# Patient Record
Sex: FEMALE | Race: WHITE | Employment: FULL TIME | URBAN - METROPOLITAN AREA
[De-identification: names, ages, dates, MRNs, and addresses within clinical notes are randomized per-mention and may not be internally consistent; named-entity substitution may affect disease eponyms.]

---

## 2017-02-09 ENCOUNTER — OFFICE VISIT (OUTPATIENT)
Dept: ORTHOPEDIC SURGERY | Age: 52
End: 2017-02-09

## 2017-02-09 VITALS
WEIGHT: 175.04 LBS | DIASTOLIC BLOOD PRESSURE: 86 MMHG | HEIGHT: 67 IN | BODY MASS INDEX: 27.47 KG/M2 | HEART RATE: 80 BPM | SYSTOLIC BLOOD PRESSURE: 148 MMHG

## 2017-02-09 DIAGNOSIS — M54.16 LUMBAR RADICULOPATHY: Primary | ICD-10-CM

## 2017-02-09 DIAGNOSIS — M25.551 RIGHT HIP PAIN: ICD-10-CM

## 2017-02-09 PROCEDURE — 99213 OFFICE O/P EST LOW 20 MIN: CPT | Performed by: ORTHOPAEDIC SURGERY

## 2017-02-09 PROCEDURE — 73502 X-RAY EXAM HIP UNI 2-3 VIEWS: CPT | Performed by: ORTHOPAEDIC SURGERY

## 2017-03-10 ENCOUNTER — OFFICE VISIT (OUTPATIENT)
Dept: ORTHOPEDIC SURGERY | Age: 52
End: 2017-03-10

## 2017-03-10 VITALS — WEIGHT: 175.04 LBS | BODY MASS INDEX: 27.47 KG/M2 | HEIGHT: 67 IN

## 2017-03-10 DIAGNOSIS — M76.31 IT BAND SYNDROME, RIGHT: ICD-10-CM

## 2017-03-10 DIAGNOSIS — M47.816 LUMBAR FACET ARTHROPATHY: Primary | ICD-10-CM

## 2017-03-10 PROCEDURE — 99213 OFFICE O/P EST LOW 20 MIN: CPT | Performed by: PHYSICIAN ASSISTANT

## 2018-11-29 ENCOUNTER — OFFICE VISIT (OUTPATIENT)
Dept: ORTHOPEDIC SURGERY | Age: 53
End: 2018-11-29
Payer: COMMERCIAL

## 2018-11-29 VITALS
BODY MASS INDEX: 28.93 KG/M2 | WEIGHT: 180 LBS | HEIGHT: 66 IN | DIASTOLIC BLOOD PRESSURE: 91 MMHG | HEART RATE: 100 BPM | SYSTOLIC BLOOD PRESSURE: 145 MMHG

## 2018-11-29 DIAGNOSIS — M25.552 BILATERAL HIP PAIN: Primary | ICD-10-CM

## 2018-11-29 DIAGNOSIS — M25.551 BILATERAL HIP PAIN: Primary | ICD-10-CM

## 2018-11-29 DIAGNOSIS — M76.32 ILIOTIBIAL BAND SYNDROME OF LEFT SIDE: ICD-10-CM

## 2018-11-29 PROCEDURE — 99213 OFFICE O/P EST LOW 20 MIN: CPT | Performed by: ORTHOPAEDIC SURGERY

## 2019-04-11 ENCOUNTER — OFFICE VISIT (OUTPATIENT)
Dept: ORTHOPEDIC SURGERY | Age: 54
End: 2019-04-11
Payer: COMMERCIAL

## 2019-04-11 VITALS — BODY MASS INDEX: 28.91 KG/M2 | HEIGHT: 66 IN | WEIGHT: 179.9 LBS | RESPIRATION RATE: 18 BRPM

## 2019-04-11 DIAGNOSIS — M18.12 ARTHRITIS OF CARPOMETACARPAL (CMC) JOINT OF LEFT THUMB: ICD-10-CM

## 2019-04-11 DIAGNOSIS — M79.645 FINGER PAIN, LEFT: Primary | ICD-10-CM

## 2019-04-11 PROCEDURE — 99203 OFFICE O/P NEW LOW 30 MIN: CPT | Performed by: ORTHOPAEDIC SURGERY

## 2019-04-11 PROCEDURE — L3924 HFO WITHOUT JOINTS PRE OTS: HCPCS | Performed by: ORTHOPAEDIC SURGERY

## 2020-02-06 ENCOUNTER — OFFICE VISIT (OUTPATIENT)
Dept: ORTHOPEDIC SURGERY | Age: 55
End: 2020-02-06
Payer: COMMERCIAL

## 2020-02-06 VITALS — WEIGHT: 175 LBS | HEIGHT: 67 IN | BODY MASS INDEX: 27.47 KG/M2

## 2020-02-06 PROBLEM — M25.512 ACUTE PAIN OF LEFT SHOULDER: Status: ACTIVE | Noted: 2020-02-06

## 2020-02-06 PROCEDURE — 99204 OFFICE O/P NEW MOD 45 MIN: CPT | Performed by: ORTHOPAEDIC SURGERY

## 2020-02-06 NOTE — PROGRESS NOTES
2/6/20  History of Present Illness:  John Sandoval is a 53 y.o. female left shoulder evaluation moderate left shoulder pain she had a type II labral repair in 2012 she is doing well until recently she had some increased pain around Thanksgiving and is gotten significantly worse    Chief complaint presents and in the office today: Left shoulder pain    Location left shoulder  Severity  Moderate  Duration since November  Associated sign/symptoms pain, decreased strength, decreased range of motion some occasional catching    I have reviewed and discussed the below Pain assessment findings with the patient. Pain Assessment  Location of Pain: Shoulder  Location Modifiers: Left  Severity of Pain: 3  Quality of Pain: Sharp, Aching  Duration of Pain: Persistent  Frequency of Pain: Intermittent  Aggravating Factors: Exercise, Bending, Straightening  Limiting Behavior: Some  Relieving Factors: Rest, Nsaids  Result of Injury: No  Work-Related Injury: No  Are there other pain locations you wish to document?: No    Medical History  Patient's medications, allergies, past medical, surgical, social and family histories were reviewed and updated as appropriate. History reviewed. No pertinent past medical history. Family History   Problem Relation Age of Onset    High Blood Pressure Father     Heart Disease Father     Diabetes Father     Anesth Problems Neg Hx     Malig Hyperten Neg Hx     Hypotension Neg Hx     Malig Hypertherm Neg Hx     Pseudochol.  Deficiency Neg Hx      Social History     Socioeconomic History    Marital status:      Spouse name: None    Number of children: None    Years of education: None    Highest education level: None   Occupational History    None   Social Needs    Financial resource strain: None    Food insecurity:     Worry: None     Inability: None    Transportation needs:     Medical: None     Non-medical: None   Tobacco Use    Smoking status: Never Smoker    Smokeless internal rotation -- between 70-90 degree    Cervical spine exam demonstrates no  Radiculopathy no reproduction of the symptomology. Range of motion is normal without pain or radiculopathy and does not cause shoulder pain. Cranial nerve exam:    1- smell-- patient states no Olfactory problem  2- visual acuity is intact  3- moves eyes, and pupils are reactive  4- extra-occular muscles are intact  5- facial sensation is intact no muscle atrophy  6- extra occular muscles are intact  7- mouth moist and facial expressions are intact  8- good hearing and no difficulty with recognition  9- patient has no difficulty swallowing  10- no difficulty breathing and no Gastrointestinal problems good cough   11- moves head with all motion and no swallowing problems  12- normal speech and tongue protrudes midline    Additional Examinations:  Right Lower Extremity: Examination of the right lower extremity does not show any tenderness, deformity or injury. Range of motion is unremarkable. There is no gross instability. There are no rashes, ulcerations or lesions. Strength and tone are normal.  Left Lower Extremity: Examination of the left lower extremity does not show any tenderness, deformity or injury. Range of motion is unremarkable. There is no gross instability. There are no rashes, ulcerations or lesions. Strength and tone are normal.  Neck: Examination of the neck does not show any tenderness, deformity or injury. Range of motion is unremarkable. There is no gross instability. There are no rashes, ulcerations or lesions.   Strength and tone are normal.        IMPRESSION:    Diagnostic testing:  X-rays reviewed in office by myself, I personally and independently reviewed the films in the office today : I reviewed multiple X-rays today of the left shoulder:  Anterior posterior, lateral, axillary:  Show no fracture, no dislocation, no signs of any masses or tumors, no significant glenohumeral arthritis, no significant a.c. Joint arthritis, good joint space maintenance,    Impression no significant bony abnormality  MRI: Ordered today to rule out re-tear of her type II SLAP repair or other labral injury  Labs: None          Past Surgical History:   Procedure Laterality Date    HIP SURGERY      HYSTERECTOMY      JOINT REPLACEMENT      right hip    KNEE ARTHROSCOPY Right 8/8/14    partial lateral menisectomy, synovectomy    SHOULDER SURGERY  2-26-13    LEFT SHOULDER ARTHROSCOPY, SUBACROMIAL DECOMPRESSION ,   .    Office Procedures:  Orders Placed This Encounter   Procedures    XR SHOULDER LEFT (MIN 2 VIEWS)     Standing Status:   Future     Number of Occurrences:   1     Standing Expiration Date:   2/6/2021       Previous Treatments: She had a type II SLAP repair back in 2012, X-ray, anti-inflammatories,    Differential Diagnoses: Impingement, AC joint osteoarthritis,  Rotator cuff tear, Labral tear, Instability, loose body,  Long head of bicep injury,  Glenohumeral osteoarthritis, AC joint separation, SLAP tear, Posterior labral tear, Anterior Labral tear, neck pathology, brachioplexis injury, muscle injury, neck radiculopathy, bone tumor, fracture,    Diagnosis reinjury to her SLAP tear        Plan: (Medical Decision Making)    1. Medications - none at this time  2. PT -she works out regularly with a   3. Further imaging -MRI  4. Follow up -after MRI      Carlos Enrique Lee. RAMU De Souza. [facility]  Sports Fellowship Trained  Board Certified  [employer] Team Physician      Disclaimer: \"This note was dictated with voice recognition software. Though review and correction are routine, we apologize for any errors. \"

## 2020-02-13 ENCOUNTER — OFFICE VISIT (OUTPATIENT)
Dept: ORTHOPEDIC SURGERY | Age: 55
End: 2020-02-13
Payer: COMMERCIAL

## 2020-02-13 VITALS — WEIGHT: 175.04 LBS | HEIGHT: 66 IN | BODY MASS INDEX: 28.13 KG/M2

## 2020-02-13 PROCEDURE — 99214 OFFICE O/P EST MOD 30 MIN: CPT | Performed by: ORTHOPAEDIC SURGERY

## 2020-02-13 PROCEDURE — 20610 DRAIN/INJ JOINT/BURSA W/O US: CPT | Performed by: ORTHOPAEDIC SURGERY

## 2020-02-13 RX ORDER — BETAMETHASONE SODIUM PHOSPHATE AND BETAMETHASONE ACETATE 3; 3 MG/ML; MG/ML
6 INJECTION, SUSPENSION INTRA-ARTICULAR; INTRALESIONAL; INTRAMUSCULAR; SOFT TISSUE ONCE
Status: COMPLETED | OUTPATIENT
Start: 2020-02-13 | End: 2020-02-13

## 2020-02-13 RX ADMIN — BETAMETHASONE SODIUM PHOSPHATE AND BETAMETHASONE ACETATE 6 MG: 3; 3 INJECTION, SUSPENSION INTRA-ARTICULAR; INTRALESIONAL; INTRAMUSCULAR; SOFT TISSUE at 11:02

## 2020-02-13 NOTE — PROGRESS NOTES
2/13/20  History of Present Illness:  Sofía Cassidy is a 53 y.o. female    Patient's chief complaint in the office today includes: Evaluation left shoulder here today to discuss options    Location left shoulder  Severity moderate to severe  Duration several months now  Associated sign/symptoms pain with range of motion pain with activities    Medical History  Patient's medications, allergies, past medical, surgical, social and family histories were reviewed and updated as appropriate. Review of Systems  No rash  No numbness  No tingling  No fevers  No depression    Pertinent items are noted in HPI  Review of systems reviewed from Patient History Form dated on 2/6/2020 and available in the patient's chart under the Media tab. No change in her medical history form                                         Examination    General Exam:   Vitals: Height 5' 6.5\" (1.689 m), weight 175 lb 0.7 oz (79.4 kg). Constitutional: Patient is adequately groomed with no evidence of malnutrition  Mental Status: The patient is oriented to time, place and person. The patient's mood and affect are appropriate. PHYSICAL EXAM:      Shoulder Examination  Inspection:  No swelling, no deformity, no erythema, no drainage, no signs of infection     Palpation:  Palpation reveals no effusion minimal pain, no warmth,     Range of Motion:  gentle range of motion with no crepitus passive motion to 150 degrees of forward flextion    Strength:  Intact strength with internal and external rotation along with  supraspinatus isolation bilaterally, Shoulder shrug is 5 over 5 , cervical spine strength is excellent, flexion extension at the elbow is 5 over 5 wrist and hand strength is equal bilaterally no winging no muscle atrophy.    Palpation:  Lateral deltoid moderate pain to palpation  AC joint mild pain to palopation  Mild pain Anterior to palpation  Mild pain Posterior to palpation  Mild trapezial pain to and passive range of motion bilaterally. Strength is excellent with internal rotation against resistance external rotation against resistance supraspinatus isolation against resistance. Shoulder shrug strength is 5 over 5 equal bilaterally. Radial ulnar and median nerve function is intact. Capillary refill is brisk. Elbow motion finger and wrist motion is full equal bilaterally. Deep tendon reflexes of the Brachial radialis, biceps, tricepsAre all +2/4 equal bilaterally. Cervical spine range of motion is full without pain negative Spurling's test.  Load-and-shift test is negative. Crank test is negative. Apprehension and relocation is negative. Anterior and posterior glide are equal bilaterally. Negative sulcus sign. No signs of any significant multidirectional instability. There is no scapular winging. There is no muscle atrophy of the latissimus dorsi, the deltoid, the periscapular musculature,The trapezius musculature or the pectoralis musculature. Negative Neer's test, negative Hudson test, no pain with crossarm elevation. Abduction --150 degrees  Flexion-- 180 degrees  Extension-- between 45-60 degrees  Latera/external  rotation --close to 90 degrees  Medial/ internal rotation -- between 70-90 degree      Reflex: upper extremity: Deep tendon reflexes of the biceps, triceps, brachioradialis +2/4 equal bilaterally  Lower extremity: +2/4 and equal bilaterally for patella and Achilles    Additional Comments:       Cervical spine exam demonstrates no  Radiculopathy no reproduction of the symptomology. Range of motion is normal without pain or radiculopathy and does not cause shoulder pain. Additional Examinations:  Right Lower Extremity: Examination of the right lower extremity does not show any tenderness, deformity or injury. Range of motion is unremarkable. There is no gross instability. There are no rashes, ulcerations or lesions.   Strength and tone are normal.  Left Lower Extremity:

## 2020-02-13 NOTE — PROGRESS NOTES
2/13/20 10:59 AM    Bupivacaine Injection . 50%      NDC: 0487-6650-53    Lot Number: [id number]    Body Part: Left Shoulder

## 2020-02-27 ENCOUNTER — OFFICE VISIT (OUTPATIENT)
Dept: ORTHOPEDIC SURGERY | Age: 55
End: 2020-02-27
Payer: COMMERCIAL

## 2020-02-27 VITALS — BODY MASS INDEX: 28.13 KG/M2 | WEIGHT: 175.04 LBS | HEIGHT: 66 IN

## 2020-02-27 PROCEDURE — 99214 OFFICE O/P EST MOD 30 MIN: CPT | Performed by: ORTHOPAEDIC SURGERY

## 2020-02-27 NOTE — PROGRESS NOTES
2/27/20  History of Present Illness:  Macho Burroughs is a 53 y.o. female    Patient's chief complaint in the office today includes: Recheck evaluation left shoulder here today to discuss options we injected her last visit she is doing better she just has a bad day today    Location left shoulder  Severity it is bothering her today but had not since the injection  Duration 1 day  Associated sign/symptoms anterior shoulder pain    Medical History  Patient's medications, allergies, past medical, surgical, social and family histories were reviewed and updated as appropriate. Review of Systems  No rash  No numbness  No tingling  No fevers  No depression    Pertinent items are noted in HPI  Review of systems reviewed from Patient History Form dated on 2/6/2020 and available in the patient's chart under the Media tab. No change in her medical history form                                         Examination    General Exam:   Vitals: Height 5' 6.5\" (1.689 m), weight 175 lb 0.7 oz (79.4 kg). Constitutional: Patient is adequately groomed with no evidence of malnutrition  Mental Status: The patient is oriented to time, place and person. The patient's mood and affect are appropriate. PHYSICAL EXAM:      Shoulder Examination  Inspection:  No swelling, no deformity, no erythema, no drainage, no signs of infection     Palpation:  Palpation reveals no effusion minimal pain, no warmth,     Range of Motion:  gentle range of motion with no crepitus passive motion to 150 degrees of forward flextion    Strength:  Intact strength with internal and external rotation along with  supraspinatus isolation bilaterally, Shoulder shrug is 5 over 5 , cervical spine strength is excellent, flexion extension at the elbow is 5 over 5 wrist and hand strength is equal bilaterally no winging no muscle atrophy.    Palpation:  Lateral deltoid no pain to palpation  AC joint no pain to palopation  Moderate pain

## 2021-05-27 NOTE — PROGRESS NOTES
HISTORY OF PRESENT ILLNESS:  The patient is a 13-year-old right-hand-dominant female who presents today for a new hand surgery specialty evaluation regarding her left thumb. She does not remember any specific injury but in the last year she started to notice increasing pain over the base of the thumb. This is noted when gripping, opening jars, or lifting grandchildren. She denies any numbness or tingling. PAST MEDICAL HISTORY: Patient's medications, allergies, past medical, surgical, social and family histories were reviewed and updated as appropriate. ROS: Pertinent items are noted in HPI. Review of systems reviewed from patient history form dated on 11/29/2018 and available in the patient's chart under the media tab. Denies fever, chills, confusion, bowel/bladder active change. PHYSICAL EXAMINATION: Examination reveals a pleasant individual in no acute distress. There appears to be satisfactory pain-free range of motion, strength, and stability of the cervical spine, shoulders, elbows, and wrists. Skin is intact without lymphadenopathy, discoloration, or abnormal temperature. There is intact, symmetric circulation in both upper extremities. Tenderness is elicited with CMC grind of the left  thumb with pain on firm pressure over the trapeziometacarpal joint. Satisfactory stability exists at the MP joint. DIAGNOSTIC TESTING: Three views of the left thumb reveal grade 2 degenerative change at the [facility] joint without obvious acute fracture. IMPRESSION AND PLAN: Degenerative arthritis of the left thumb. We discussed this common entity and appropriate conservative and surgical options. Appropriate initial steps include activity modification, rest, splinting, hand therapy, and injection. I also recommend utilizing  modifiers that decrease thumb pinch stress. Surgical intervention can usually be reserved for longstanding recalcitrant cases.      Today we will supply her with a left thumb spica neoprene
0.5